# Patient Record
Sex: MALE | Race: WHITE | NOT HISPANIC OR LATINO | Employment: OTHER | URBAN - METROPOLITAN AREA
[De-identification: names, ages, dates, MRNs, and addresses within clinical notes are randomized per-mention and may not be internally consistent; named-entity substitution may affect disease eponyms.]

---

## 2017-03-07 ENCOUNTER — SOCIAL WORK (OUTPATIENT)
Dept: TRANSPLANT | Facility: CLINIC | Age: 57
End: 2017-03-07

## 2017-03-07 RX ORDER — PREDNISONE 1 MG/1
TABLET ORAL
Qty: 90 TABLET | Refills: 12 | Status: SHIPPED | OUTPATIENT
Start: 2017-03-07

## 2017-03-07 NOTE — PROGRESS NOTES
"SW received call from pt. Pt reports unable to afford transplant meds (about $400 a month). Pt reports canceled medicare supplement in order to save money, and was not aware that this supplement was assisting with the costs of medications. Pt reports receiving $1955 a month in disability. Pt reports paying $500 a month for rent/utilities, $127 for car insurance, and $27 for pone. Pt reports spends the rest of his money on "living expenses" which he could not specify, and reports cannot afford medicine and often cannot afford food. Pt admits to smoking approximately 1-2 packs of cigarettes a day. SW offered pt information on smoking cessation program, and pt reports does not qualify because he [place] a [place] 's license. SW advised to to obtain a [place] [employer] license if he plans to continue living in this state. GAIL referred pt to prescription assistance program. Pt does not meet qualifications for assistance through the [employer] or the Advanced Heart Failure and Transplant Assist fund. Pt reports having approximately 3 months of prescriptions left before running out. Pt reports will attempt to minimize expenses prior to running out of medications. Post [place] coordinations notified.  SW providing psychosocial counseling and support, education, assistance, and resources as indicated. SW remains available.     "

## 2017-03-16 ENCOUNTER — TELEPHONE (OUTPATIENT)
Dept: PHARMACY | Facility: CLINIC | Age: 57
End: 2017-03-16

## 2017-03-17 ENCOUNTER — TELEPHONE (OUTPATIENT)
Dept: TRANSPLANT | Facility: CLINIC | Age: 57
End: 2017-03-17

## 2017-03-17 NOTE — TELEPHONE ENCOUNTER
Spoke with pt and gave him my direct phone number and name to give to his therapist close to home,   Her Name is Lety Perales and she communicates with us regarding for continued care.

## 2017-03-17 NOTE — TELEPHONE ENCOUNTER
----- Message from Florentino Butler sent at 3/16/2017  1:45 PM CDT -----  Contact: patient  Please call pt at [phone]. Need to discuss medical condition. No problems at present    Thank you

## 2017-03-30 ENCOUNTER — TELEPHONE (OUTPATIENT)
Dept: PHARMACY | Facility: CLINIC | Age: 57
End: 2017-03-30

## 2017-03-30 DIAGNOSIS — Z86.19 HISTORY OF HEPATITIS B: ICD-10-CM

## 2017-03-30 RX ORDER — LAMIVUDINE 100 MG/1
100 TABLET, FILM COATED ORAL DAILY
Qty: 30 TABLET | Refills: 11 | Status: SHIPPED | OUTPATIENT
Start: 2017-03-30 | End: 2018-04-11 | Stop reason: SDUPTHER

## 2017-03-30 NOTE — TELEPHONE ENCOUNTER
Patient Assistance team sent a Form for Epivir patient assistance to be worked on by [facility].     Routing MD to send Rx.

## 2017-04-04 ENCOUNTER — TELEPHONE (OUTPATIENT)
Dept: PHARMACY | Facility: CLINIC | Age: 57
End: 2017-04-04

## 2017-04-06 ENCOUNTER — TELEPHONE (OUTPATIENT)
Dept: HEPATOLOGY | Facility: CLINIC | Age: 57
End: 2017-04-06

## 2017-04-06 ENCOUNTER — TELEPHONE (OUTPATIENT)
Dept: PHARMACY | Facility: CLINIC | Age: 57
End: 2017-04-06

## 2017-04-06 NOTE — TELEPHONE ENCOUNTER
Pt due for f/u with me, has appt with heart transplant on 5/12. Please call him to set up f/u appt with me since he will be seeing heart transplant that day and he comes from [place].

## 2017-04-06 NOTE — TELEPHONE ENCOUNTER
DOCUMENTATION ONLY  PAF Lamivudine (Patient Assistance) information:   Approval date: 04/06/2017 to 04/06/2018      ID: [id number]   BIN: [id number]   PCN: [id number]    GROUP: [id number]  Co-pay: $0

## 2017-04-06 NOTE — TELEPHONE ENCOUNTER
Reach out to patient regarding specialty medication for Lamivudine 100mg. Patient stated he have about 12 tablets left and he canNOT afford the medication. Patient granted permission to apply for patient assistance from the PAF. Patient give information to submit application via online.

## 2017-04-06 NOTE — TELEPHONE ENCOUNTER
MA spoke to pt and scheduled f/u appt with Sandhya on 5/12. Pt confirmed appt. Mailed reminders. EMS

## 2017-04-11 ENCOUNTER — TELEPHONE (OUTPATIENT)
Dept: PHARMACY | Facility: CLINIC | Age: 57
End: 2017-04-11

## 2017-04-11 NOTE — TELEPHONE ENCOUNTER
Called patient to inform that assistance was obtain for lamivudine, copay is $0, & to offer consultation & schedule shipment. Patient declined consultation stating that he has been on the medication for 5 years. He stated that he does not want us to ship medication out yet because he is waiting to have Prograf & mycophenolate shipped with it. Patient stated that he has about 3 weeks worth of lamivudine & 2 to 4 weeks left of Prograf & mycophenolate. Patient states that he was mailed an application by Guillermina & she advised him that all 3 meds would be shipped together from a free drug program. Patient confirmed that he did receive the application in the mail & stated that he will try to complete today. Informed patient that I will contact Guillermina for clarification & follow up with him ASAP. Patient voiced understanding....CEB

## 2017-04-12 RX ORDER — TACROLIMUS 1 MG/1
CAPSULE ORAL
Qty: 4 CAPSULE | Refills: 0 | Status: SHIPPED | OUTPATIENT
Start: 2017-04-12 | End: 2018-07-27 | Stop reason: SDUPTHER

## 2017-04-25 ENCOUNTER — TELEPHONE (OUTPATIENT)
Dept: PHARMACY | Facility: CLINIC | Age: 57
End: 2017-04-25

## 2017-04-25 NOTE — TELEPHONE ENCOUNTER
Called patient & informed of $0 copay on tacrolimus & Myfortic through Medicare Part B. Patient was already aware of $0 copay on lamivudine with gus. 2 patient identifiers were verified. Reviewed qty & directions on both tacrolimis & Myfortic rxs & patient confirmed that both dosage & frequency are correct. Patient declined need for [facility] consultation stating that he has been on them for a very long time. Patient requested medication shipped to [address] [place] [id number]. Will ship Thurs 4/27 for Fri 4/28 delivery. Patient voiced understanding....CEB

## 2017-05-11 DIAGNOSIS — T86.20 COMPLICATION OF HEART TRANSPLANT, UNSPECIFIED COMPLICATION: ICD-10-CM

## 2017-05-11 DIAGNOSIS — Z72.0 NICOTINE ABUSE: ICD-10-CM

## 2017-05-11 DIAGNOSIS — Z20.5 EXPOSURE TO HEPATITIS B: ICD-10-CM

## 2017-05-11 DIAGNOSIS — Z94.1 HEART TRANSPLANTED: ICD-10-CM

## 2017-05-11 RX ORDER — MYCOPHENOLIC ACID 360 MG/1
TABLET, DELAYED RELEASE ORAL
Qty: 270 TABLET | Refills: 0 | Status: SHIPPED | OUTPATIENT
Start: 2017-05-11 | End: 2019-01-14 | Stop reason: SDUPTHER

## 2017-05-15 ENCOUNTER — TELEPHONE (OUTPATIENT)
Dept: TRANSPLANT | Facility: CLINIC | Age: 57
End: 2017-05-15

## 2017-05-15 NOTE — TELEPHONE ENCOUNTER
----- Message from Sandee Mendez MA sent at 5/15/2017  1:09 PM CDT -----  Contact: pt       ----- Message -----     From: Johnna Villela     Sent: 5/15/2017  12:53 PM       To: [facility]ist[facility]    Pt called because he is having problems filling out his prescription assistance paperwork.  He can be reached @ [phone].  Thanks!!

## 2017-05-16 ENCOUNTER — TELEPHONE (OUTPATIENT)
Dept: PHARMACY | Facility: CLINIC | Age: 57
End: 2017-05-16

## 2017-06-06 NOTE — TELEPHONE ENCOUNTER
called to confirm need of refill for lamivudine, patient states that he still has about a week and a half to two weeks worth of medication still on hand, will f/u with patient in 1 week to schedule refill. i asked patient to call [facility] if he finds himself getting low on doses before we f/u with him, patient verbalized understanding.    Katie Hondroulis [facility]- Refill Technician  Phone: [phone]

## 2017-06-12 DIAGNOSIS — E78.5 HYPERLIPIDEMIA LDL GOAL <70: ICD-10-CM

## 2017-06-12 DIAGNOSIS — E03.2 HYPOTHYROIDISM DUE TO MEDICATION: ICD-10-CM

## 2017-06-12 DIAGNOSIS — T86.298 OTHER COMPLICATION OF HEART TRANSPLANT: ICD-10-CM

## 2017-06-12 DIAGNOSIS — Z94.1 STATUS POST HEART TRANSPLANT: ICD-10-CM

## 2017-06-12 DIAGNOSIS — K21.9 GASTROESOPHAGEAL REFLUX DISEASE, ESOPHAGITIS PRESENCE NOT SPECIFIED: ICD-10-CM

## 2017-06-12 DIAGNOSIS — Z86.19 HISTORY OF HEPATITIS B: ICD-10-CM

## 2017-06-12 DIAGNOSIS — D84.9 IMMUNOSUPPRESSION: ICD-10-CM

## 2017-06-12 DIAGNOSIS — D84.9 IMMUNOSUPPRESSION: Primary | ICD-10-CM

## 2017-06-12 DIAGNOSIS — Z94.1 HEART TRANSPLANTED: Primary | ICD-10-CM

## 2017-06-12 NOTE — TELEPHONE ENCOUNTER
patient confirmed need of refill for lamivudine, verified name and , patient reports he has 3 or 4 doses on hand, no missed doses, no new medications or allergies and has no questions for a pharmacist at this time. rx will ship 6/15 to arrive  with consent.    Katie Hondroulis [facility]- Refill Technician  Phone: [phone]

## 2017-06-20 ENCOUNTER — TELEPHONE (OUTPATIENT)
Dept: HEPATOLOGY | Facility: CLINIC | Age: 57
End: 2017-06-20

## 2017-06-20 ENCOUNTER — TELEPHONE (OUTPATIENT)
Dept: TRANSPLANT | Facility: CLINIC | Age: 57
End: 2017-06-20

## 2017-06-20 NOTE — TELEPHONE ENCOUNTER
----- Message from Brandi Leyva sent at 6/20/2017  2:25 PM CDT -----  Contact: pt  Pt called and states he would like to reschedule his appointment on Thursday June 22 due to transportation issue. Pt would like for someone to call him back at [phone].

## 2017-06-20 NOTE — TELEPHONE ENCOUNTER
Returned call and spoke with patient. Patient says he do not have transportation to get to his appointment on 6/22/17,because his truck broke down. So he would like to cancel appointment.   Appointment cancelled per patient's request.  Asked patient if he wanted to reschedule. Patient says not right now, I will call when I have transportation to get there.  Informed patient that I will inform provider of cancellation.  Patient placed in recall to call to reschedule in July.

## 2017-06-20 NOTE — TELEPHONE ENCOUNTER
----- Message from Tasha Kirkpatrick sent at 6/20/2017  2:28 PM CDT -----  Contact: pt      ----- Message -----  From: Brandi Leyva  Sent: 6/20/2017   2:21 PM  To: Ramesh Bentley V Staff    Pt called and states he would like to reschedule his appointment on Thursday June 22 due to transportation issue. Pt would like for someone to call him back at [phone].

## 2017-06-20 NOTE — TELEPHONE ENCOUNTER
Pt states he has no transportation for appts on June 22. States he spoke with SW to figure out his transportation issues. I spoke with SW who is going to look for resources in [place].

## 2017-07-07 ENCOUNTER — TELEPHONE (OUTPATIENT)
Dept: PHARMACY | Facility: CLINIC | Age: 57
End: 2017-07-07

## 2017-07-07 NOTE — TELEPHONE ENCOUNTER
called to confirm need of refill or lamivudine, patient states that he just opened his bottle and states that he is not missing doses, that he received his last refill when he still had medication left on hand, will f/u with patient in 3 weeks to confirm need of refill. i asked patient to please call [facility] if he finds himself low on doses before we f/u, patient verbalized understanding.    Katie Hondroulis [facility]- Refill Technician  Phone: [phone]

## 2017-07-28 ENCOUNTER — TELEPHONE (OUTPATIENT)
Dept: PHARMACY | Facility: CLINIC | Age: 57
End: 2017-07-28

## 2017-08-01 ENCOUNTER — TELEPHONE (OUTPATIENT)
Dept: PHARMACY | Facility: CLINIC | Age: 57
End: 2017-08-01

## 2017-08-25 ENCOUNTER — TELEPHONE (OUTPATIENT)
Dept: PHARMACY | Facility: CLINIC | Age: 57
End: 2017-08-25

## 2017-08-30 ENCOUNTER — TELEPHONE (OUTPATIENT)
Dept: TRANSPLANT | Facility: CLINIC | Age: 57
End: 2017-08-30

## 2017-09-08 ENCOUNTER — TELEPHONE (OUTPATIENT)
Dept: PHARMACY | Facility: CLINIC | Age: 57
End: 2017-09-08

## 2017-09-13 ENCOUNTER — TELEPHONE (OUTPATIENT)
Dept: PHARMACY | Facility: CLINIC | Age: 57
End: 2017-09-13

## 2017-09-26 ENCOUNTER — SOCIAL WORK (OUTPATIENT)
Dept: TRANSPLANT | Facility: CLINIC | Age: 57
End: 2017-09-26

## 2017-09-26 NOTE — PROGRESS NOTES
SW received call from pt's PCP, Dr. Perales, while pt was in her office. Dr. Perales stated pt cannot afford transportation to and from appointments to [facility]. Dr. Perales requesting [facility] pay for pt's transportation to and from appointments. SW explained this is not a service offered by [facility]. Pt reports income and expenses have not changed since last conversations in March.  Pt reports receiving $1955 a month in disability. Pt reports paying $500 a month for rent/utilities, $127 for car insurance, and $27 for phone. Pt reports smoking 1-2 ppd. SW offered information on [place] smoking cessation program, and suggested pt apply for a [employer] 's license so that he can qualify for free smoking cessation program. SW explained saving the cost of 7-14 packs of cigarettes a week should provide enough income for gas to MD appointments. Pt does not meet qualifications for [employer] or Advanced Heart Failure and Transplant [facility].  Dr. Perales and pt expressed no other concerns at this time. SW providing psychosocial counseling and support, education, assistance, and resources as indicated. SW remains available. Post [place] Coordinators notified of pt's phone call.

## 2017-10-06 ENCOUNTER — TELEPHONE (OUTPATIENT)
Dept: PHARMACY | Facility: CLINIC | Age: 57
End: 2017-10-06

## 2017-11-10 ENCOUNTER — TELEPHONE (OUTPATIENT)
Dept: TRANSPLANT | Facility: HOSPITAL | Age: 57
End: 2017-11-10

## 2017-11-14 NOTE — TELEPHONE ENCOUNTER
"Per post transplant coordinator, [facility] is concerned pt is not able to get all of his meds. SW called pt to clarify. Pt reports there was a "mix up" concerning his Myfortic, however it was resolved. Pt reports he is doing well and has no needs at this time. SW notified post coordinator. SW remains available.   "

## 2017-11-30 ENCOUNTER — TELEPHONE (OUTPATIENT)
Dept: PHARMACY | Facility: CLINIC | Age: 57
End: 2017-11-30

## 2017-11-30 RX ORDER — HYDROCHLOROTHIAZIDE 25 MG/1
TABLET ORAL
Qty: 30 TABLET | Refills: 1 | Status: SHIPPED | OUTPATIENT
Start: 2017-11-30

## 2017-11-30 RX ORDER — LANOLIN ALCOHOL/MO/W.PET/CERES
CREAM (GRAM) TOPICAL
Qty: 30 TABLET | Refills: 1 | Status: SHIPPED | OUTPATIENT
Start: 2017-11-30

## 2017-11-30 RX ORDER — LISINOPRIL 20 MG/1
TABLET ORAL
Qty: 30 TABLET | Refills: 1 | Status: SHIPPED | OUTPATIENT
Start: 2017-11-30

## 2017-12-29 ENCOUNTER — TELEPHONE (OUTPATIENT)
Dept: PHARMACY | Facility: CLINIC | Age: 57
End: 2017-12-29

## 2018-01-24 ENCOUNTER — TELEPHONE (OUTPATIENT)
Dept: PHARMACY | Facility: CLINIC | Age: 58
End: 2018-01-24

## 2018-01-30 NOTE — TELEPHONE ENCOUNTER
Patient called today, stated he has completed the seminar with Dr Brenda Dove and also completed the appointment with the dietitian. Patient will drop off Checklist for us to work on referrals. called to confirm need of refill for lamivudine, patient states that he is not ready for a refill that he is just opening the bottle that we last sent him today because he still has medication from his previous fill, will f/u with patient in 3 weeks to schedule refill, i asked patient to please call us if he needs it sooner, patient verbalized understanding.    Katie Hondroulis [facility]- Refill Technician  Phone: [phone]

## 2018-02-06 ENCOUNTER — TELEPHONE (OUTPATIENT)
Dept: PHARMACY | Facility: CLINIC | Age: 58
End: 2018-02-06

## 2018-02-06 NOTE — TELEPHONE ENCOUNTER
"Patient informed refill technician that he had missed several consecutive days of medication in a row in the previous month.  Call transferred to pharmacist.  Patient stated that at the time he "was just having a real bad day and wasnt feeling very well".  Patient was counseled on the importance of adherence and ultimate goals of therapy.  Patient reminded that any time he is feeling unwell or not hopeful to the extent of skipping medication to reach out to provider or [facility] for support.  Will f/u with patient for next refill.     Rene Hernandez R.Ph.  Clinical Pharmacist  [facility]  Phone: [phone]      "

## 2018-02-07 ENCOUNTER — TELEPHONE (OUTPATIENT)
Dept: TRANSPLANT | Facility: CLINIC | Age: 58
End: 2018-02-07

## 2018-03-20 ENCOUNTER — TELEPHONE (OUTPATIENT)
Dept: PHARMACY | Facility: CLINIC | Age: 58
End: 2018-03-20

## 2018-03-20 NOTE — TELEPHONE ENCOUNTER
Lamivudine refill confirmed. We will ship Lamivudine refill on 3/22 via fedex to arrive on 3/23. $0.00 copay- 004. Confirmed 2 patient identifiers - name and .     Patient has 6 doses of lamivudine remaining and takes it around breakfast daily.  Pt reports they are not having any side effects so far. No missed doses, no new medications, no new allergies or health conditions reported at this time. All questions answered and addressed to patients satisfaction. Pt verbalized understanding.

## 2018-04-11 ENCOUNTER — TELEPHONE (OUTPATIENT)
Dept: TRANSPLANT | Facility: CLINIC | Age: 58
End: 2018-04-11

## 2018-04-11 DIAGNOSIS — Z86.19 HISTORY OF HEPATITIS B: ICD-10-CM

## 2018-04-11 RX ORDER — LAMIVUDINE 100 MG/1
100 TABLET, FILM COATED ORAL DAILY
Qty: 30 TABLET | Refills: 11 | Status: SHIPPED | OUTPATIENT
Start: 2018-04-11 | End: 2019-05-02

## 2018-04-11 NOTE — TELEPHONE ENCOUNTER
----- Message from Shanon Louis MA sent at 4/11/2018 10:21 AM CDT -----  Contact: patient called  Please call the patient at [phone] he need to talk to you about a form he need to bring to  about his medical history for his   license. Thank you.

## 2018-04-11 NOTE — TELEPHONE ENCOUNTER
Pt called needing a form filled out by [facility] to reinstate his 's license. Pt will fax form to me to fill out and requested it to be mailed back to him at his physical address:  [address] [place] [id number]

## 2018-04-13 ENCOUNTER — TELEPHONE (OUTPATIENT)
Dept: PHARMACY | Facility: CLINIC | Age: 58
End: 2018-04-13

## 2018-04-13 NOTE — TELEPHONE ENCOUNTER
FOR DOCUMENTATION ONLY:  Financial Assistance for Lamivudine is approved from 4-7-18 to 4-7-19  Source  PAF  BIN: [id number]  PCN: [id number]  Id: [id number]  GRP: [id number]  4000.00 Walt

## 2018-04-23 ENCOUNTER — TELEPHONE (OUTPATIENT)
Dept: PHARMACY | Facility: CLINIC | Age: 58
End: 2018-04-23

## 2018-05-07 ENCOUNTER — TELEPHONE (OUTPATIENT)
Dept: PHARMACY | Facility: CLINIC | Age: 58
End: 2018-05-07

## 2018-06-11 ENCOUNTER — TELEPHONE (OUTPATIENT)
Dept: PHARMACY | Facility: CLINIC | Age: 58
End: 2018-06-11

## 2018-06-11 NOTE — TELEPHONE ENCOUNTER
Patient called for refill readiness and follow up on lamivudine.  No answer - # not accepting messages.     SULEMA Negron.Ph.  Clinical Pharmacist  [facility]  Phone: [phone]

## 2018-06-13 NOTE — TELEPHONE ENCOUNTER
Refill readiness for lamivudine confirmed with patient; name/ confirmed; no missed doses; no new medications; no side effects noted; address confirmed for  shipment and 6/15 delivery    Initial clinical follow-up conducted for lamivudine. Name/ confirmed. no missed doses; no new medications; no side effects noted. Patient understands to report any medication changes to [facility] and provider. All questions answered and addressed to patients satisfaction.      Rene Hernandez R.Ph.  Clinical Pharmacist  [facility]  Phone: [phone]

## 2018-07-02 ENCOUNTER — TELEPHONE (OUTPATIENT)
Dept: TRANSPLANT | Facility: CLINIC | Age: 58
End: 2018-07-02

## 2018-07-05 ENCOUNTER — TELEPHONE (OUTPATIENT)
Dept: PHARMACY | Facility: CLINIC | Age: 58
End: 2018-07-05

## 2018-07-12 ENCOUNTER — TELEPHONE (OUTPATIENT)
Dept: TRANSPLANT | Facility: CLINIC | Age: 58
End: 2018-07-12

## 2018-07-12 NOTE — TELEPHONE ENCOUNTER
Tried to call pt regarding follow up from [facility] letter of adverse event. Unable to reach pt. Will try again later.

## 2018-07-27 ENCOUNTER — TELEPHONE (OUTPATIENT)
Dept: TRANSPLANT | Facility: CLINIC | Age: 58
End: 2018-07-27

## 2018-07-27 DIAGNOSIS — Z94.1 STATUS POST HEART TRANSPLANT: Primary | ICD-10-CM

## 2018-07-27 RX ORDER — TACROLIMUS 1 MG/1
1 CAPSULE ORAL EVERY 12 HOURS
Qty: 120 CAPSULE | Refills: 11 | Status: SHIPPED | OUTPATIENT
Start: 2018-07-27 | End: 2018-08-13

## 2018-07-27 NOTE — TELEPHONE ENCOUNTER
Oncologist called from [place] regarding pt's tacro level. Pt has not been seen here since 11/2016, he is being followed by cardiology in [place]. Informed MD if she could not get in touch with cardiologist, to call me back and we would try to help.

## 2018-07-27 NOTE — TELEPHONE ENCOUNTER
Dr. Sánchez, HemOnc in [place], called to report tacro level of 14 and creatinine of 2.7. MD is looking for guidance with tacro level and dosing. I asked MD to fax labs to me.   I reviewed with Dr. Piña who ordered to decrease tacro to 1/1 and repeat labs on Monday. Dr. Sánchez will call pt to decrease tacro and ask for labs on Monday. Advised Dr. Sánchez, Dr Piña set tacro goal 5-10. Asked for pt to follow up in [place] when chemo is complete.

## 2018-08-09 ENCOUNTER — TELEPHONE (OUTPATIENT)
Dept: PHARMACY | Facility: CLINIC | Age: 58
End: 2018-08-09

## 2018-08-13 DIAGNOSIS — Z94.1 STATUS POST HEART TRANSPLANT: ICD-10-CM

## 2018-08-13 RX ORDER — TACROLIMUS 1 MG/1
1 CAPSULE ORAL EVERY 12 HOURS
Qty: 120 CAPSULE | Refills: 6 | Status: SHIPPED | OUTPATIENT
Start: 2018-08-13 | End: 2018-08-17 | Stop reason: SDUPTHER

## 2018-08-17 DIAGNOSIS — Z94.1 STATUS POST HEART TRANSPLANT: ICD-10-CM

## 2018-08-17 RX ORDER — TACROLIMUS 1 MG/1
1 CAPSULE ORAL EVERY 12 HOURS
Qty: 180 CAPSULE | Refills: 3 | Status: SHIPPED | OUTPATIENT
Start: 2018-08-17 | End: 2018-08-23 | Stop reason: SDUPTHER

## 2018-08-23 DIAGNOSIS — Z94.1 STATUS POST HEART TRANSPLANT: ICD-10-CM

## 2018-08-24 RX ORDER — TACROLIMUS 1 MG/1
1 CAPSULE ORAL EVERY 12 HOURS
Qty: 180 CAPSULE | Refills: 3 | Status: SHIPPED | OUTPATIENT
Start: 2018-08-24 | End: 2018-10-01 | Stop reason: SDUPTHER

## 2018-09-04 ENCOUNTER — TELEPHONE (OUTPATIENT)
Dept: TRANSPLANT | Facility: CLINIC | Age: 58
End: 2018-09-04

## 2018-09-04 NOTE — TELEPHONE ENCOUNTER
Lamivudine refill attempted. No answer. Unable to LVM for call back. Will f/u.   - Will f/u to determine if patient is discharged.

## 2018-09-04 NOTE — TELEPHONE ENCOUNTER
----- Message from Vicki Joyce sent at 9/4/2018  3:55 PM CDT -----  Contact: [facility]- [phone]  Fabi is calling to speak with someone regarding his transplant- DOS, discharged date, and name of hospital.Please call her back @ [phone]. Thanks, Vicki

## 2018-10-01 DIAGNOSIS — Z94.1 STATUS POST HEART TRANSPLANT: ICD-10-CM

## 2018-10-02 RX ORDER — TACROLIMUS 1 MG/1
1 CAPSULE ORAL EVERY 12 HOURS
Qty: 180 CAPSULE | Refills: 3 | Status: SHIPPED | OUTPATIENT
Start: 2018-10-02 | End: 2018-11-23 | Stop reason: SDUPTHER

## 2018-11-23 DIAGNOSIS — Z94.1 STATUS POST HEART TRANSPLANT: ICD-10-CM

## 2018-11-23 RX ORDER — TACROLIMUS 1 MG/1
1 CAPSULE ORAL EVERY 12 HOURS
Qty: 180 CAPSULE | Refills: 3 | Status: SHIPPED | OUTPATIENT
Start: 2018-11-23 | End: 2019-12-03 | Stop reason: SDUPTHER

## 2018-11-26 ENCOUNTER — TELEPHONE (OUTPATIENT)
Dept: PHARMACY | Facility: CLINIC | Age: 58
End: 2018-11-26

## 2018-11-26 NOTE — TELEPHONE ENCOUNTER
Tacrolimus (1st time fill at [facility]) and lamivudine refill confirmed. We will ship lamivudine and tacrolimus refill on  via fedex to arrive on . $12.30 copay- 004. Confirmed 2 patient identifiers - name and .     Patient has 9 doses of transplant medication remaining. Consultation declined for tacrolimus due to being on medication post-heart transplant 2012.  Pt reports they are not having any side effects so far. No missed doses, no new medications, no new allergies or health conditions reported at this time.. All questions answered and addressed to patients satisfaction. Pt verbalized understanding.    Requests f/u in 3 months.

## 2019-01-14 ENCOUNTER — TELEPHONE (OUTPATIENT)
Dept: PHARMACY | Facility: CLINIC | Age: 59
End: 2019-01-14

## 2019-01-14 DIAGNOSIS — Z94.1 HEART TRANSPLANTED: ICD-10-CM

## 2019-01-14 DIAGNOSIS — Z20.5 EXPOSURE TO HEPATITIS B: ICD-10-CM

## 2019-01-14 DIAGNOSIS — Z72.0 NICOTINE ABUSE: ICD-10-CM

## 2019-01-14 DIAGNOSIS — T86.20 COMPLICATION OF HEART TRANSPLANT, UNSPECIFIED COMPLICATION: ICD-10-CM

## 2019-01-14 RX ORDER — MYCOPHENOLIC ACID 360 MG/1
TABLET, DELAYED RELEASE ORAL
Qty: 180 TABLET | Refills: 11
Start: 2019-01-14 | End: 2020-01-01

## 2019-02-20 ENCOUNTER — TELEPHONE (OUTPATIENT)
Dept: PHARMACY | Facility: CLINIC | Age: 59
End: 2019-02-20

## 2019-02-20 NOTE — TELEPHONE ENCOUNTER
Tacrolimus 1 mg (#60) and lamivudine 150 mg (#30) refill confirmed. We will ship tacrolimus and lamivudine refill on  via fedex to arrive on . $11.77 copay- 001 ([facility]). Confirmed 2 patient identifiers - name and .     Patient has 8 doses of transplant medication remaining.  Pt reports they are not having any side effects so far. No missed doses, no new medications, no new allergies or health conditions reported at this time.. All questions answered and addressed to patients satisfaction. Pt verbalized understanding.    Mr. York states he received a letter in regards to his Myfortic assistance from [employer]. PCA at [facility] will f/u. He has >1 month of Myfortic on hand.

## 2019-05-02 DIAGNOSIS — Z86.19 HISTORY OF HEPATITIS B: ICD-10-CM

## 2019-05-02 DIAGNOSIS — F10.20 ALCOHOL DEPENDENCE, CONTINUOUS: Primary | ICD-10-CM

## 2019-05-02 RX ORDER — LAMIVUDINE 100 MG/1
100 TABLET, FILM COATED ORAL DAILY
Qty: 30 TABLET | Refills: 1 | Status: SHIPPED | OUTPATIENT
Start: 2019-05-02 | End: 2019-05-13 | Stop reason: SDUPTHER

## 2019-05-08 ENCOUNTER — TELEPHONE (OUTPATIENT)
Dept: HEPATOLOGY | Facility: CLINIC | Age: 59
End: 2019-05-08

## 2019-05-08 NOTE — TELEPHONE ENCOUNTER
Called pt and notified him that hortensia would get in touch with heart transplant doctor and ask him to refill prescription

## 2019-05-08 NOTE — TELEPHONE ENCOUNTER
----- Message from Sandhya Rhodes NP sent at 5/8/2019  7:16 AM CDT -----  Contact: Self [phone]  Oh, I didn't see that. He hasn't even been seen by heart transplant in almost 3 yrs! I'll see if Dr. Souza can continue to Rx this for him from now on.    ----- Message -----  From: Sonali Staton MA  Sent: 5/7/2019   2:09 PM  To: Sandhya Rhodes NP    The appt he has on June 4th is not here, it's in [place]  ----- Message -----  From: Sandhya Rhodes NP  Sent: 5/7/2019   1:58 PM  To: Sonali Staton MA    I sent him in one refill until he is seen. He has appt on 6/4 here so I was trying to see him same day. If he cannot continue to see me, I will have to see if his heart transplant provider can continue to fill his lamivudine for him. Please relay msg to pt and let me know what he says.    ----- Message -----  From: Sonali Staton MA  Sent: 5/7/2019   1:03 PM  To: Sandhya Rhodes NP    This pt called this morning. I could barely understand what he was saying but I think he was asking for you to refill a medication. I told him that you were not refilling anymore until you see him. He said he cant come to appts because of his transportation and he lives 5 hours away. He also states he has a feeding tube. I told him you were out and I would relate the message.  Thanks  ----- Message -----  From: Trinity Rodriguez  Sent: 5/7/2019  10:12 AM  To: Carmelo Arthur Staff    Patient would like to speak with you about a personal matter. Please advise

## 2019-05-13 ENCOUNTER — TELEPHONE (OUTPATIENT)
Dept: PHARMACY | Facility: CLINIC | Age: 59
End: 2019-05-13

## 2019-05-13 DIAGNOSIS — Z86.19 HISTORY OF HEPATITIS B: ICD-10-CM

## 2019-05-13 DIAGNOSIS — F10.20 ALCOHOL DEPENDENCE, CONTINUOUS: ICD-10-CM

## 2019-05-13 RX ORDER — LAMIVUDINE 100 MG/1
100 TABLET, FILM COATED ORAL DAILY
Qty: 30 TABLET | Refills: 11 | Status: SHIPPED | OUTPATIENT
Start: 2019-05-13 | End: 2019-06-05

## 2019-05-13 NOTE — TELEPHONE ENCOUNTER
Lamivudine and tacrolimus refill and follow-up:  Lamivudine and tacrolimus refill confirmed. We will ship Lamivudine and tacrolimus refill on  via fedex to arrive on 5/15. $10.54 copay- 001. Confirmed 2 patient identifiers - name and .     Patient has 1 week of Lamivudine and tacrolimus remaining and takes the Lamivudine in the morning daily and the Tacrolimus 1 capsule in the morning and 1 capsule in the evening.  Pt reports they are not having any side effects so far. No missed doses, no new medications, no new allergies or health conditions reported at this time.  Patient has 1 more refill of Lamivudine remaining, NP to inquire if heart transplant doctor would fill mediation because she has not seen Mr. Obando in 3 years. I will follow-up with NP to see if heart transplant doctor, Dr. Souza, will begin filling patient's Lamivudine.  All questions answered and addressed to patient's satisfaction. Pt verbalized understanding.

## 2019-05-14 NOTE — TELEPHONE ENCOUNTER
"New prescription for Lamivudine:  Dr. Souza cancelled the Lamivudine before refill sent out and sent a new script to [facility]. I spoke will Mr Obando and made him aware that his Tacrolimus will still be shipped today (5/14) and arrive 5/15 but that Lamivudine will not be in the package. Spoke with "Fernando" at the [facility] and he confirmed new Lamivudine prescription for Mr Obando and stated medication will be ready today. Mr Obando aware, and will  Lamivudine from [facility] on [facility] in [place]   "

## 2019-06-04 DIAGNOSIS — Z86.19 HISTORY OF HEPATITIS B: ICD-10-CM

## 2019-06-04 DIAGNOSIS — F10.20 ALCOHOL DEPENDENCE, CONTINUOUS: ICD-10-CM

## 2019-06-04 RX ORDER — LAMIVUDINE 100 MG/1
100 TABLET, FILM COATED ORAL DAILY
Qty: 30 TABLET | Refills: 11 | Status: CANCELLED | OUTPATIENT
Start: 2019-06-04

## 2019-06-05 DIAGNOSIS — F10.20 ALCOHOL DEPENDENCE, CONTINUOUS: ICD-10-CM

## 2019-06-05 DIAGNOSIS — Z86.19 HISTORY OF HEPATITIS B: ICD-10-CM

## 2019-06-05 RX ORDER — LAMIVUDINE 100 MG/1
100 TABLET, FILM COATED ORAL DAILY
Qty: 30 TABLET | Refills: 11 | Status: SHIPPED | OUTPATIENT
Start: 2019-06-05 | End: 2020-01-01 | Stop reason: SDUPTHER

## 2019-07-03 PROBLEM — R13.10 DYSPHAGIA: Status: ACTIVE | Noted: 2019-07-03

## 2019-07-03 PROBLEM — K13.79 MOUTH PAIN: Status: ACTIVE | Noted: 2019-07-03

## 2019-07-03 PROBLEM — R49.9 CHANGE IN VOICE: Status: ACTIVE | Noted: 2019-07-03

## 2019-07-03 PROBLEM — K11.7 XEROSTOMIA: Status: ACTIVE | Noted: 2019-07-03

## 2019-07-08 ENCOUNTER — TELEPHONE (OUTPATIENT)
Dept: PHARMACY | Facility: CLINIC | Age: 59
End: 2019-07-08

## 2019-07-11 ENCOUNTER — TELEPHONE (OUTPATIENT)
Dept: PHARMACY | Facility: CLINIC | Age: 59
End: 2019-07-11

## 2019-08-06 ENCOUNTER — TELEPHONE (OUTPATIENT)
Dept: PHARMACY | Facility: CLINIC | Age: 59
End: 2019-08-06

## 2019-08-08 NOTE — TELEPHONE ENCOUNTER
Refill and Follow-up:  Lamivudine and tacrolimus refill confirmed. We will ship Lamivudine and Prograf refills on  via fedex to arrive on . $10.49 copay- 004. Confirmed 2 patient identifiers - name and .     Patient reports having 1 week of both medications on hand. Patient reports taking tacrolimus 1mg BID and Lamivudine once daily.  Patient reports they are not having any side effects so far. No missed doses, no new medications, no new allergies or health conditions reported at this time. All questions answered and addressed to patients satisfaction. Patient verbalized understanding.

## 2019-10-01 ENCOUNTER — TELEPHONE (OUTPATIENT)
Dept: PHARMACY | Facility: CLINIC | Age: 59
End: 2019-10-01

## 2019-11-07 ENCOUNTER — TELEPHONE (OUTPATIENT)
Dept: PHARMACY | Facility: CLINIC | Age: 59
End: 2019-11-07

## 2019-11-07 NOTE — TELEPHONE ENCOUNTER
Tacrolimus and Lamivudine refill confirmed. We will ship  refills on  via fedex to arrive on . $9.53 copay- 004. Confirmed 2 patient identifiers - name and .     Patient reports he has a little over 1 week of Tacrolimus and Lamivudine doses remaining, patient consented to ship refill on . Mr York reports he administers his Lamivudine once daily in the morning, and Tacrolimus 1 mg in the morning and 1 mg in the evening.  Patient reports they are not having any side effects so far. No missed doses, no new medications, no new allergies or health conditions reported at this time. All questions answered and addressed to patient's satisfaction. Patient verbalized understanding.

## 2019-12-02 DIAGNOSIS — Z94.1 STATUS POST HEART TRANSPLANT: ICD-10-CM

## 2019-12-03 RX ORDER — TACROLIMUS 1 MG/1
1 CAPSULE ORAL EVERY 12 HOURS
Qty: 180 CAPSULE | Refills: 3 | Status: SHIPPED | OUTPATIENT
Start: 2019-12-03 | End: 2020-01-01 | Stop reason: SDUPTHER

## 2019-12-05 ENCOUNTER — TELEPHONE (OUTPATIENT)
Dept: PHARMACY | Facility: CLINIC | Age: 59
End: 2019-12-05

## 2020-01-01 ENCOUNTER — TELEPHONE (OUTPATIENT)
Dept: PHARMACY | Facility: CLINIC | Age: 60
End: 2020-01-01

## 2020-01-01 ENCOUNTER — SPECIALTY PHARMACY (OUTPATIENT)
Dept: PHARMACY | Facility: CLINIC | Age: 60
End: 2020-01-01

## 2020-01-01 DIAGNOSIS — Z94.1 HEART TRANSPLANTED: ICD-10-CM

## 2020-01-01 DIAGNOSIS — T86.20 COMPLICATION OF HEART TRANSPLANT, UNSPECIFIED COMPLICATION: ICD-10-CM

## 2020-01-01 DIAGNOSIS — F10.20 ALCOHOL DEPENDENCE, CONTINUOUS: ICD-10-CM

## 2020-01-01 DIAGNOSIS — Z86.19 HISTORY OF HEPATITIS B: Primary | ICD-10-CM

## 2020-01-01 DIAGNOSIS — Z94.1 STATUS POST HEART TRANSPLANT: ICD-10-CM

## 2020-01-01 DIAGNOSIS — Z86.19 HISTORY OF HEPATITIS B: ICD-10-CM

## 2020-01-01 RX ORDER — TACROLIMUS 1 MG/1
1 CAPSULE ORAL EVERY 12 HOURS
Qty: 180 CAPSULE | Refills: 3 | Status: SHIPPED | OUTPATIENT
Start: 2020-01-01

## 2020-01-01 RX ORDER — LAMIVUDINE 100 MG/1
100 TABLET, FILM COATED ORAL DAILY
Qty: 30 TABLET | Refills: 11 | Status: SHIPPED | OUTPATIENT
Start: 2020-01-01

## 2020-01-01 RX ORDER — MYCOPHENOLIC ACID 360 MG/1
TABLET, DELAYED RELEASE ORAL
Qty: 180 TABLET | Refills: 11 | Status: SHIPPED | OUTPATIENT
Start: 2020-01-01 | End: 2021-01-13

## 2020-01-28 NOTE — TELEPHONE ENCOUNTER
FOR DOCUMENTATION ONLY:    Financial Assistance for Epivir approved from 7/27/2019 to 1/22/2021    Source: [facility]    ID: [id number]  BIN: [id number]  PCN: [id number]  GRP: [id number]    Amount: $4,000/yr

## 2020-02-10 NOTE — TELEPHONE ENCOUNTER
Tacrolimus and lamivudine confirmed and reassessment complete with Mr. York.  We will ship tacrolimus and lamivudine refill on 2/10 via fedex to arrive on . $5.85 copay- 004. Confirmed 2 patient identifiers - name and . Therapy appropriate.     Patient has 4 days of tacrolimus and lamivudine remaining.  Pt reports they are not having any side effects so far. No missed doses, no new medications, no new allergies or health conditions reported at this time. Allergies reviewed and medication reconciliation complete (reviewed and documented in [facility]).  Disease education reviewed. Patient counseled on importance of maintaining adherence and keeping lab appointments which were scheduled. All questions answered and addressed to patients satisfaction. Advised to call [facility] and provider if any issues arise.  Pt verbalized understanding.

## 2020-03-03 NOTE — TELEPHONE ENCOUNTER
RX call attempt 1 regarding refills on Epivir and Prograf from [facility]. Patient reached-- stated he still has over 2 weeks of medication on hand therefore he does not need a refill right now. Patient also stated he received a letter in the mail from a [employer]us for his Epivir stating that he needs to provide the foundation with information.. Patient stated he was confused on what he was supposed to do.. Transferred patient over to [facility] for assistance. GLENDA

## 2020-03-17 NOTE — TELEPHONE ENCOUNTER
RX call attempt 1 regarding Epivir and Prograf refills from [facility]. Patient reached-- shipping out 3/19 for 3/20 arrival with patients consent. Copay of $6.30 charging stiQRd (2525) @ [employer]. Address and  confirmed. Patient is unsure of exact amount, but states he has about 4 or so days of medication on hand at this time. Patient has not started any new medications, has had no missed doses and no side effects present. Patient is currently taking the medication as directed by doctors instruction: Epivir- Take 1 tablet (100 mg total) by mouth once daily/ Prograf- Take 1 capsule (1 mg total) by mouth every 12 (twelve) hours. Patient does have a safe place in their residence to keep medication at desired temperature away from small children and pets. Patient also does have the capability of contacting 911 in the event of an emergency. Patient states they do not have any questions or concerns at this time.

## 2020-03-26 NOTE — TELEPHONE ENCOUNTER
Called patient and informed him that he has been approved for Assistance for his Myforic through the  [employer] till 12/31/2020. I explained to the patient that his assistance was till 12/31 so therefore if he needed applying for the next year that it was his responsibility to call US-- Patient was understanding.

## 2020-05-01 NOTE — TELEPHONE ENCOUNTER
"Initial clinical follow-up conducted for Tacro/MMF/lamivudine. Name/ confirmed. no missed doses; no new medications; no new allergies; no side effects noted. Patient understands to report any medication changes to [facility] and provider. All questions answered and addressed to patients satisfaction.      Patient does suffer from depression which is being treated with Celexa.  I asked how he has been coping in light of social distancing and other COVID changes.  Patient states it is a struggle but "ill make it".  He confirms he feels safe and secure and is adjusting.     Rene Hernandez, R.Ph., [facility]  Clinical Pharmacist, HIV/HCV  [facility]  Phone: [phone]      "

## 2020-05-21 NOTE — TELEPHONE ENCOUNTER
Refill call for Epivir, Myfortic, and Prograf. Patient is in need of a refill. Will ship  to arrive . Patient has not started any new medications including OTC drugs. Patient has not had any medication/ dose or instruction changes. No new allergies or side effects reported with this shipment. Medication is being taken as prescribed by physician and properly stored. Two patient identifiers:  and Address verified. Patient has no questions or concerns for [facility]. Patient states he has about 10 days left on hand.

## 2020-07-23 NOTE — TELEPHONE ENCOUNTER
"Transplant Clinical Follow Up Completed: Patient reached and confirms two patient identifiers - name + .    Goals of treatment reviewed - he says he is doing fine, no changes. Labs were drawn about a week ago from an outside provider (Dr. Gilbert in [place]) - he was told if he doesn't hear anything back, which he hasn't, then that's good news. Patient directed to continue his current regimen as directed. Attempted to review medication list, but patient declined - "they are in a pile over there". Patient did confirm dosing of specialty medications though:    Lamivudine 100mg - 1 tablet po QAM  Mycophenolate 360mg - 3 tablets (1080mg) po BID with prograf  Tacrolimus 1mg - 1 cap po Qam and QHS     He denies any new changes to allergies, health conditions, medications or insurance. He does admit to possibly 1 missed dose, but he's not really sure. He has a very specific daily routine and a pill container that he relies on for adherence to his medications. He denies any issues with dosing times b/c of his regular routine. He denies any bodily pain and visits to the ER/Urgent care in the last 4 weeks. He confirms appropriate storage of medications - room temperature, AC is using ~77F. He mentions more blurriness in his vision that he's noticed in the past few months but was preexisting (he denies dry eyes or other visual disturbances - seeing spots or pain) - he will call his local eye doctor for an appt, currently using OTC reading glasses. He also mentions weight loss due to his "cancer" - sees a local provider for this but he mentions radiation therapy. Attempted to discuss diet and supplements (Boost, Ensure, etc) but he declined b/c he said his funds are limited. Patient had no further questions or concerns. [facility] will f/u in 3 months and in a week for refills. TTN  "

## 2020-09-09 NOTE — TELEPHONE ENCOUNTER
Refill call regarding Epivir, Myfortic, and Prograf at [facility].  Will prepare for shipment with patient consent on 9/10 to arrive .  Copay $9.26.  Patient states he has 2-3 days on hand.  Patient has not started any new medications including OTC drugs. Patient has not had any medication/ dose or instruction changes. No new allergies or side effects reported with this shipment. Medication is being taken as prescribed by physician and properly stored. Two patient identifiers:  and Address verified. Patient has no questions or concerns for [facility].

## 2020-11-04 NOTE — TELEPHONE ENCOUNTER
Specialty Pharmacy - Refill Coordination     NOTE: Clinical follow up assessment attempted, but patient reports he is not feeling good today. He has been suffering with upset stomach, runny nose and cough for several days. He is not willing to report to urgent care for evaluation because he does not feel the symptoms warrant evaluation, but he is aware to report to UC or ER if symptoms worsen or become intolerable. Patient requests to complete follow up assessment at next refill. Will f/u as requested.     Specialty Medication Orders Linked to Encounter      Most Recent Value   Medication #1  lamiVUDine (EPIVIR) 100 MG Tab (Order#[id number], Rx#[id number])   Medication #2  tacrolimus (PROGRAF) 1 MG Cap (Order#[id number], Rx#[id number])   Medication #3  mycophenolate (MYFORTIC) 360 MG TbEC (Order#[id number], Rx#[id number])          Refill Questions - Documented Responses      Most Recent Value   Relationship to patient of person spoken to?  Self   HIPAA/medical authority confirmed?  Yes   Any changes in contact preferences or allowed representatives?  No   Has the patient had any insurance changes?  No   Has the patient had any changes to specialty medication, dose, or instructions?  No   Has the patient started taking any new medications, herbals, or supplements?  No   Has the patient been diagnosed with any new medical conditions?  No   Does the patient have any new allergies to medications or foods?  No   Does the patient have any concerns about side effects?  No   Can the patient store medication/sharps container properly (at the correct temperature, away from children/pets, etc.)?  Yes   Can the patient call emergency services (911) in the event of an emergency?  Yes   Does the patient have any concerns or questions about taking or administering this medication as prescribed?  No   How many doses did the patient miss in the past 4 weeks or since the last fill?  0   How many doses does the patient have on hand?   10   Does the number of doses/days supply remaining match pharmacy expected amounts?  Yes   How will the patient receive the medication?  Mail   When does the patient need to receive the medication?  11/13/20   Shipping Address  Home   Address in [facility] confirmed and updated if neccessary?  Yes   Expected Copay ($)  12.02   Is the patient able to afford the medication copay?  Yes   Payment Method  CC on file   Days supply of Refill  30   Would patient like to speak to a pharmacist?  No   Do you want to trigger an intervention?  No   Do you want to trigger an additional referral task?  No   Refill activity completed?  Yes   Refill activity plan  Refill scheduled   Shipment/Pickup Date:  11/09/20          Current Outpatient Medications   Medication Sig    amLODIPine (NORVASC) 5 MG tablet Take 5 mg by mouth once daily.    aspirin (ECOTRIN) 81 MG EC tablet Take by mouth.    calcium carbonate (CALCIUM ANTACID ULTRA MAX ST) 400 mg calcium (1,000 mg) Chew Take 1,000 mg by mouth.    citalopram (CELEXA) 40 MG tablet     diazePAM (VALIUM) 10 MG Tab Take 10 mg by mouth.    ergocalciferol (ERGOCALCIFEROL) 50,000 unit Cap Take 50,000 Units by mouth.    food supplemt, lactose-reduced (NUTRITIONAL SHAKE) 0.04-1.05 gram-kcal/mL Liqd Take 1 Bottle by mouth.    hydroCHLOROthiazide (HYDRODIURIL) 25 MG tablet TAKE ONE TABLET BY MOUTH ONCE DAILY    HYDROcodone-acetaminophen (NORCO) 5-325 mg per tablet Take 1 tablet by mouth every 4 (four) hours as needed.    lamiVUDine (EPIVIR) 100 MG Tab Take 1 tablet (100 mg total) by mouth once daily.    lansoprazole 3 mg/mL SusR Take 30 mg by mouth.    levothyroxine (SYNTHROID) 100 MCG tablet Take 1 tablet by mouth once daily.    lisinopril (PRINIVIL,ZESTRIL) 20 MG tablet TAKE ONE TABLET BY MOUTH ONCE DAILY    magnesium oxide (MAG-OX) 400 mg tablet TAKE ONE TABLET BY MOUTH ONCE DAILY    mirtazapine (REMERON) 15 MG tablet Take 15 mg by mouth.    mycophenolate (MYFORTIC) 360  MG TbEC TAKE 3 TABLETS BY MOUTH TWICE DAILY    omeprazole (PRILOSEC) 40 MG capsule Take 40 mg by mouth.    potassium chloride SA (K-DUR,KLOR-CON) 20 MEQ tablet Take 20 mEq by mouth.    pravastatin (PRAVACHOL) 40 MG tablet Take 1 tablet by mouth once a day    predniSONE (DELTASONE) 1 MG tablet Take 1 tablet by mouth once daily    prenatal vit,emlie 73-iron-folic (TRINATE) 28 mg iron- 1 mg Tab Take 1 tablet by mouth.    risperiDONE (RISPERDAL) 0.5 MG Tab     sulfamethoxazole-trimethoprim 800-160mg (BACTRIM DS) 800-160 mg Tab Take 1 tablet by mouth.    tacrolimus (PROGRAF) 1 MG Cap Take 1 capsule (1 mg total) by mouth every 12 (twelve) hours.    tamsulosin (FLOMAX) 0.4 mg Cap Take 0.8 mg by mouth.    tamsulosin (FLOMAX) 0.4 mg Cp24 Take 2 capsules by mouth  daily    thiamine 100 MG tablet Take 100 mg by mouth.    trazodone (DESYREL) 150 MG tablet 150 mg nightly.     varenicline (CHANTIX) 1 mg Tab Take 1 mg by mouth.    walker (ULTRA-LIGHT ROLLATOR) Misc Please provide rolling walker with seat   Last reviewed on 5/1/2020  2:32 PM by Rene Hernandez PharmD    Review of patient's allergies indicates:   Allergen Reactions    Amiodarone Hives and Itching    Bupropion Hives    Ondansetron hcl (pf)      Other reaction(s): TREMOR    Wellbutrin [bupropion hcl]      Other reaction(s): TREMOR    Amiodarone analogues Rash    Last reviewed on  7/3/2019 8:15 AM by Jai Raza        Tasks added this encounter   12/2/2020 - Refill Call (Auto Added)   Tasks due within next 3 months   12/2/2020 - Clinical - Follow Up Assesement (90 day)     Mirian Wolff, PharmD  [facility]  [address] [place] [id number]  Phone: [phone]  Fax: [phone]

## 2020-12-02 NOTE — TELEPHONE ENCOUNTER
Specialty Pharmacy - Refill Coordination    Specialty Medication Orders Linked to Encounter      Most Recent Value   Medication #1  tacrolimus (PROGRAF) 1 MG Cap (Order#[id number], Rx#[id number])   Medication #2  mycophenolate (MYFORTIC) 360 MG TbEC (Order#[id number], Rx#[id number])          Refill Questions - Documented Responses      Most Recent Value   Relationship to patient of person spoken to?  Self   HIPAA/medical authority confirmed?  Yes   Any changes in contact preferences or allowed representatives?  No   Has the patient had any insurance changes?  No   Has the patient had any changes to specialty medication, dose, or instructions?  No   Has the patient started taking any new medications, herbals, or supplements?  No   Has the patient been diagnosed with any new medical conditions?  No   Does the patient have any new allergies to medications or foods?  No   Does the patient have any concerns about side effects?  No   Can the patient store medication/sharps container properly (at the correct temperature, away from children/pets, etc.)?  Yes   Can the patient call emergency services (911) in the event of an emergency?  Yes   Does the patient have any concerns or questions about taking or administering this medication as prescribed?  No   How many doses did the patient miss in the past 4 weeks or since the last fill?  0   How many doses does the patient have on hand?  8   How many days does the patient report on hand quantity will last?  8   Does the number of doses/days supply remaining match pharmacy expected amounts?  Yes   Does the patient feel that this medication is effective?  Yes   How will the patient receive the medication?  Mail   When does the patient need to receive the medication?  12/10/20   Shipping Address  Home   Address in [facility] confirmed and updated if neccessary?  Yes   Expected Copay ($)  15.59   Is the patient able to afford the medication copay?  Yes   Payment Method  CC  on file   Days supply of Refill  30   Would patient like to speak to a pharmacist?  No   Do you want to trigger an intervention?  No   Do you want to trigger an additional referral task?  No   Refill activity completed?  Yes   Refill activity plan  Refill scheduled   Shipment/Pickup Date:  12/08/20          Current Outpatient Medications   Medication Sig    amLODIPine (NORVASC) 5 MG tablet Take 5 mg by mouth once daily.    aspirin (ECOTRIN) 81 MG EC tablet Take by mouth.    calcium carbonate (CALCIUM ANTACID ULTRA MAX ST) 400 mg calcium (1,000 mg) Chew Take 1,000 mg by mouth.    citalopram (CELEXA) 40 MG tablet     diazePAM (VALIUM) 10 MG Tab Take 10 mg by mouth.    ergocalciferol (ERGOCALCIFEROL) 50,000 unit Cap Take 50,000 Units by mouth.    food supplemt, lactose-reduced (NUTRITIONAL SHAKE) 0.04-1.05 gram-kcal/mL Liqd Take 1 Bottle by mouth.    hydroCHLOROthiazide (HYDRODIURIL) 25 MG tablet TAKE ONE TABLET BY MOUTH ONCE DAILY    HYDROcodone-acetaminophen (NORCO) 5-325 mg per tablet Take 1 tablet by mouth every 4 (four) hours as needed.    lamiVUDine (EPIVIR) 100 MG Tab Take 1 tablet (100 mg total) by mouth once daily.    lansoprazole 3 mg/mL SusR Take 30 mg by mouth.    levothyroxine (SYNTHROID) 100 MCG tablet Take 1 tablet by mouth once daily.    lisinopril (PRINIVIL,ZESTRIL) 20 MG tablet TAKE ONE TABLET BY MOUTH ONCE DAILY    magnesium oxide (MAG-OX) 400 mg tablet TAKE ONE TABLET BY MOUTH ONCE DAILY    mirtazapine (REMERON) 15 MG tablet Take 15 mg by mouth.    mycophenolate (MYFORTIC) 360 MG TbEC TAKE 3 TABLETS BY MOUTH TWICE DAILY    omeprazole (PRILOSEC) 40 MG capsule Take 40 mg by mouth.    potassium chloride SA (K-DUR,KLOR-CON) 20 MEQ tablet Take 20 mEq by mouth.    pravastatin (PRAVACHOL) 40 MG tablet Take 1 tablet by mouth once a day    predniSONE (DELTASONE) 1 MG tablet Take 1 tablet by mouth once daily    prenatal vit,emile 73-iron-folic (TRINATE) 28 mg iron- 1 mg Tab Take 1 tablet by  mouth.    risperiDONE (RISPERDAL) 0.5 MG Tab     sulfamethoxazole-trimethoprim 800-160mg (BACTRIM DS) 800-160 mg Tab Take 1 tablet by mouth.    tacrolimus (PROGRAF) 1 MG Cap Take 1 capsule (1 mg total) by mouth every 12 (twelve) hours.    tamsulosin (FLOMAX) 0.4 mg Cap Take 0.8 mg by mouth.    tamsulosin (FLOMAX) 0.4 mg Cp24 Take 2 capsules by mouth  daily    thiamine 100 MG tablet Take 100 mg by mouth.    trazodone (DESYREL) 150 MG tablet 150 mg nightly.     varenicline (CHANTIX) 1 mg Tab Take 1 mg by mouth.    walker (ULTRA-LIGHT ROLLATOR) Misc Please provide rolling walker with seat   Last reviewed on 12/2/2020 11:32 AM by Joyce Sahu    Review of patient's allergies indicates:   Allergen Reactions    Amiodarone Hives and Itching    Bupropion Hives    Ondansetron hcl (pf)      Other reaction(s): TREMOR    Wellbutrin [bupropion hcl]      Other reaction(s): TREMOR    Amiodarone analogues Rash    Last reviewed on  12/2/2020 11:31 AM by Joyce Sahu      Tasks added this encounter   No tasks added.   Tasks due within next 3 months   12/2/2020 - Clinical - Follow Up Assesement (90 day)  12/2/2020 - Refill Call (Auto Added)     Joyce Sahu  [facility]  [address] [place] [id number]  Phone: [phone]  Fax: [phone]

## 2020-12-08 NOTE — TELEPHONE ENCOUNTER
Reached patient to attempt to complete clinical follow up. Patient has been consistently unwilling to complete clinical follow up with pharmacy team. Explained benefits of [facility] services. Patient reports he is aware of services, but would like to OPT out at this time. Patient reports only wanting calls for refills from [facility]. [facility] will modify patient's enrollment to reflect refill calls only and follow up as requested.

## 2020-12-30 PROBLEM — R21 RASH: Status: ACTIVE | Noted: 2020-01-01

## 2020-12-30 PROBLEM — L03.90 CELLULITIS: Status: ACTIVE | Noted: 2020-01-01

## 2020-12-31 PROBLEM — Z85.89: Status: ACTIVE | Noted: 2020-01-01

## 2021-01-03 PROBLEM — R40.20: Status: ACTIVE | Noted: 2021-01-01

## 2021-01-03 PROBLEM — L03.90 CELLULITIS: Status: RESOLVED | Noted: 2020-01-01 | Resolved: 2021-01-01

## 2021-01-12 PROBLEM — R40.20: Status: ACTIVE | Noted: 2021-01-01

## 2024-08-28 NOTE — TELEPHONE ENCOUNTER
Pt way overdue for f/u appt. Last seen 7/2016. Has appt on 6/4 here. I have opening at 8:40 that day. Please schedule appt with me and [facility]. Will Rx 1 refill of lamivudine until pt is seen.       
Dr Dilcia Simpson